# Patient Record
Sex: FEMALE | Race: WHITE | Employment: STUDENT | URBAN - METROPOLITAN AREA
[De-identification: names, ages, dates, MRNs, and addresses within clinical notes are randomized per-mention and may not be internally consistent; named-entity substitution may affect disease eponyms.]

---

## 2018-06-11 ENCOUNTER — HOSPITAL ENCOUNTER (OUTPATIENT)
Age: 23
Discharge: HOME | End: 2018-06-11
Attending: FAMILY MEDICINE
Payer: COMMERCIAL

## 2018-06-11 VITALS
TEMPERATURE: 99 F | RESPIRATION RATE: 18 BRPM | HEART RATE: 79 BPM | DIASTOLIC BLOOD PRESSURE: 72 MMHG | OXYGEN SATURATION: 100 % | SYSTOLIC BLOOD PRESSURE: 130 MMHG

## 2018-06-11 DIAGNOSIS — N76.0 BACTERIAL VAGINOSIS: Primary | ICD-10-CM

## 2018-06-11 DIAGNOSIS — B96.89 BACTERIAL VAGINOSIS: Primary | ICD-10-CM

## 2018-06-11 PROCEDURE — 87106 FUNGI IDENTIFICATION YEAST: CPT | Performed by: FAMILY MEDICINE

## 2018-06-11 PROCEDURE — 99204 OFFICE O/P NEW MOD 45 MIN: CPT

## 2018-06-11 PROCEDURE — 87808 TRICHOMONAS ASSAY W/OPTIC: CPT | Performed by: FAMILY MEDICINE

## 2018-06-11 PROCEDURE — 87205 SMEAR GRAM STAIN: CPT | Performed by: FAMILY MEDICINE

## 2018-06-11 RX ORDER — METRONIDAZOLE 7.5 MG/G
1 GEL VAGINAL NIGHTLY
Qty: 5 TUBE | Refills: 0 | Status: SHIPPED | OUTPATIENT
Start: 2018-06-11 | End: 2018-06-16

## 2018-06-11 NOTE — ED PROVIDER NOTES
Patient Seen in: [address]    History   Patient presents with:  Eval-G (gynecologic)    Stated Complaint: gyne problem    HPI  52-year-old female presents to [address] [place] [address] with concern for BV.   Has had watery, malodorous discharge and no friability. Right adnexum displays no mass and no tenderness. Left adnexum displays no mass and no tenderness. No erythema, tenderness or bleeding in the vagina. No signs of injury around the vagina. Vaginal discharge (watery, fishy odor) found.    L

## 2018-06-11 NOTE — ED INITIAL ASSESSMENT (HPI)
Pt reports thin, \"watery\", malodorous vaginal discharge with itching that began on Friday. Denies nausea, fever, chills. Pt concerned for bacterial vaginosis. Denies pain. LMP 6/6/18.